# Patient Record
Sex: MALE | Race: WHITE
[De-identification: names, ages, dates, MRNs, and addresses within clinical notes are randomized per-mention and may not be internally consistent; named-entity substitution may affect disease eponyms.]

---

## 2018-04-02 ENCOUNTER — HOSPITAL ENCOUNTER (EMERGENCY)
Dept: HOSPITAL 62 - ER | Age: 33
Discharge: HOME | End: 2018-04-02
Payer: OTHER GOVERNMENT

## 2018-04-02 VITALS — SYSTOLIC BLOOD PRESSURE: 150 MMHG | DIASTOLIC BLOOD PRESSURE: 85 MMHG

## 2018-04-02 DIAGNOSIS — S61.213A: Primary | ICD-10-CM
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
## 2018-04-02 DIAGNOSIS — Z23: ICD-10-CM

## 2018-04-02 PROCEDURE — 90714 TD VACC NO PRESV 7 YRS+ IM: CPT

## 2018-04-02 PROCEDURE — 12001 RPR S/N/AX/GEN/TRNK 2.5CM/<: CPT

## 2018-04-02 PROCEDURE — 99282 EMERGENCY DEPT VISIT SF MDM: CPT

## 2018-04-02 PROCEDURE — 90471 IMMUNIZATION ADMIN: CPT

## 2018-04-02 NOTE — ER DOCUMENT REPORT
ED General





- General


Chief Complaint: Laceration


Stated Complaint: LACERATION TO LEFT MIDDLE FINGER


Time Seen by Provider: 04/02/18 17:30


TRAVEL OUTSIDE OF THE [place] IN LAST 30 DAYS: No





- HPI


Notes: 





32-year-old male presents status post laceration to his left third finger.  

Patient was handling a piece of clean sheet-metal when he slipped and lacerated 

the back of his finger.  Minimal blood loss on scene.  Sharp burning pain, 

nonradiating.  No other injury.  Tetanus is out of date.  No other modifying 

factors, no other associated symptoms, no other provocative or palliative 

factors.





- Related Data


Allergies/Adverse Reactions: 


 





No Known Allergies Allergy (Verified 04/02/18 17:07)


 











Past Medical History





- General


Information source: Patient





- Social History


Smoking Status: Never Smoker


Family History: Reviewed & Not Pertinent





- Medical History


Medical History: Negative





- Immunizations


Hx Diphtheria, Pertussis, Tetanus Vaccination: Yes





Review of Systems





- Review of Systems


Notes: 





He denies any head injury, chest pain, abdominal pain.  Otherwise as history of 

present illness





Physical Exam





- Vital signs


Vitals: 


 











Temp Pulse Resp BP Pulse Ox


 


 98.3 F   99   17   150/85 H  97 


 


 04/02/18 17:17  04/02/18 17:17  04/02/18 17:17  04/02/18 17:17  04/02/18 17:17














- Notes


Notes: 





General: Well-developed, well-nourished


HEENT: Normocephalic. No external trauma noted. No chung sign, no 

hemotympanum. Mucosa is moist. No intraoral trauma.


Neck: Midline trachea, no JVD. No midline cervical spine tenderness. No step-

off or deformity.


Chest: Normal excursion, no accessory muscle use. No gross trauma.


Abdomen: Soft, nondistended. Nontender. No bruising.


Pelvis: Stable.


Vascular: Strong and symmetric upper and lower extremity pulses. Well-perfused 

extremities.


Motor: Normal tone and power.


Neurologic: Alert, nonfocal. Sensation symmetric and intact.


Skin: No significant lacerations or purpura.


Extremities: No cyanosis.  Approximately 2 cm laceration noted to the 

dorsomedial aspect of the third finger distally, crosses the DIP.  Mild gaping.

  Control bleeding.  No foreign body.





Course





- Re-evaluation


Re-evalutation: 








Well-appearing male with isolated laceration to his finger.  Plan update tetanus

, we will anesthetized via digital block, clean wound and repair.  Ditches will 

need to be removed in 7-10 days.


04/02/18 18:16








Procedure note: Local anesthesia is achieved with 2% lidocaine with 

epinephrine.  Wound is irrigated copiously explored, no evidence of tenderness, 

vascular or nerve disruption.  The wound is then closed in a single layer using 

5-0 Ethilon, there is good tissue approximation cosmesis, no complications.  

There is a total of 2 cm of wound closure achieved.





- Vital Signs


Vital signs: 


 











Temp Pulse Resp BP Pulse Ox


 


 98.3 F   99   17   150/85 H  97 


 


 04/02/18 17:17  04/02/18 17:17  04/02/18 17:17  04/02/18 17:17  04/02/18 17:17














Discharge





- Discharge


Clinical Impression: 


Finger laceration


Qualifiers:


 Encounter type: initial encounter Finger: middle finger Damage to nail status: 

without damage Foreign body presence: without foreign body Laterality: left 

Qualified Code(s): S61.213A - Laceration without foreign body of left middle 

finger without damage to nail, initial encounter





Disposition: HOME, SELF-CARE


Instructions:  Laceration Care ([facility])


Additional Instructions: 


Sutures to be removed in 7-10 days